# Patient Record
Sex: FEMALE | Race: BLACK OR AFRICAN AMERICAN
[De-identification: names, ages, dates, MRNs, and addresses within clinical notes are randomized per-mention and may not be internally consistent; named-entity substitution may affect disease eponyms.]

---

## 2017-06-12 ENCOUNTER — HOSPITAL ENCOUNTER (EMERGENCY)
Dept: HOSPITAL 17 - NEPE | Age: 42
Discharge: HOME | End: 2017-06-12
Payer: MEDICAID

## 2017-06-12 VITALS
HEART RATE: 99 BPM | TEMPERATURE: 98.4 F | OXYGEN SATURATION: 99 % | DIASTOLIC BLOOD PRESSURE: 79 MMHG | RESPIRATION RATE: 20 BRPM | SYSTOLIC BLOOD PRESSURE: 132 MMHG

## 2017-06-12 VITALS — BODY MASS INDEX: 46.09 KG/M2 | WEIGHT: 260.15 LBS | HEIGHT: 63 IN

## 2017-06-12 VITALS — OXYGEN SATURATION: 97 %

## 2017-06-12 DIAGNOSIS — R07.89: Primary | ICD-10-CM

## 2017-06-12 DIAGNOSIS — R00.0: ICD-10-CM

## 2017-06-12 LAB
ALP SERPL-CCNC: 54 U/L (ref 45–117)
ALT SERPL-CCNC: 46 U/L (ref 10–53)
ANION GAP SERPL CALC-SCNC: 9 MEQ/L (ref 5–15)
APTT BLD: 25.9 SEC (ref 24.3–30.1)
AST SERPL-CCNC: 35 U/L (ref 15–37)
BASOPHILS # BLD AUTO: 0.1 TH/MM3 (ref 0–0.2)
BASOPHILS NFR BLD: 0.6 % (ref 0–2)
BILIRUB SERPL-MCNC: 0.3 MG/DL (ref 0.2–1)
BUN SERPL-MCNC: 12 MG/DL (ref 7–18)
CHLORIDE SERPL-SCNC: 102 MEQ/L (ref 98–107)
CK MB SERPL-MCNC: 1.4 NG/ML (ref 0.5–3.6)
CK SERPL-CCNC: 254 U/L (ref 26–192)
EOSINOPHIL # BLD: 0.3 TH/MM3 (ref 0–0.4)
EOSINOPHIL NFR BLD: 3.1 % (ref 0–4)
ERYTHROCYTE [DISTWIDTH] IN BLOOD BY AUTOMATED COUNT: 13.6 % (ref 11.6–17.2)
GFR SERPLBLD BASED ON 1.73 SQ M-ARVRAT: 74 ML/MIN (ref 89–?)
HCO3 BLD-SCNC: 27.8 MEQ/L (ref 21–32)
HCT VFR BLD CALC: 40.4 % (ref 35–46)
HEMO FLAGS: (no result)
INR PPP: 0.9 RATIO
LYMPHOCYTES # BLD AUTO: 2.9 TH/MM3 (ref 1–4.8)
LYMPHOCYTES NFR BLD AUTO: 27.9 % (ref 9–44)
MCH RBC QN AUTO: 27.3 PG (ref 27–34)
MCHC RBC AUTO-ENTMCNC: 33.1 % (ref 32–36)
MCV RBC AUTO: 82.4 FL (ref 80–100)
MONOCYTES NFR BLD: 3.9 % (ref 0–8)
NEUTROPHILS # BLD AUTO: 6.7 TH/MM3 (ref 1.8–7.7)
NEUTROPHILS NFR BLD AUTO: 64.5 % (ref 16–70)
PLATELET # BLD: 204 TH/MM3 (ref 150–450)
POTASSIUM SERPL-SCNC: 4.1 MEQ/L (ref 3.5–5.1)
PROTHROMBIN TIME: 10.2 SEC (ref 9.8–11.6)
RBC # BLD AUTO: 4.91 MIL/MM3 (ref 4–5.3)
SODIUM SERPL-SCNC: 139 MEQ/L (ref 136–145)
WBC # BLD AUTO: 10.4 TH/MM3 (ref 4–11)

## 2017-06-12 PROCEDURE — 82550 ASSAY OF CK (CPK): CPT

## 2017-06-12 PROCEDURE — 85730 THROMBOPLASTIN TIME PARTIAL: CPT

## 2017-06-12 PROCEDURE — 99285 EMERGENCY DEPT VISIT HI MDM: CPT

## 2017-06-12 PROCEDURE — 84703 CHORIONIC GONADOTROPIN ASSAY: CPT

## 2017-06-12 PROCEDURE — 71010: CPT

## 2017-06-12 PROCEDURE — 85379 FIBRIN DEGRADATION QUANT: CPT

## 2017-06-12 PROCEDURE — 82552 ASSAY OF CPK IN BLOOD: CPT

## 2017-06-12 PROCEDURE — 85610 PROTHROMBIN TIME: CPT

## 2017-06-12 PROCEDURE — 93005 ELECTROCARDIOGRAM TRACING: CPT

## 2017-06-12 PROCEDURE — 85025 COMPLETE CBC W/AUTO DIFF WBC: CPT

## 2017-06-12 PROCEDURE — 80053 COMPREHEN METABOLIC PANEL: CPT

## 2017-06-12 PROCEDURE — 84484 ASSAY OF TROPONIN QUANT: CPT

## 2017-06-12 NOTE — PD
HPI


Chief Complaint:  Chest Pain


Time Seen by Provider:  12:36


Travel History


International Travel<30 days:  No


Contact w/Intl Traveler<30days:  No


Traveled to known affect area:  No





History of Present Illness


HPI


41-year-old female with a history of hypertension and asthma presents to the 

emergency department for evaluation of chest pain.  Patient states that she's 

had intermittent sharp chest pain underneath her left breast for the past 3 

days.  States that she's noticed the pain is present when she is sitting still 

and she has less pain when moving.  She states that this morning she is now 

having pain both under her left breast and her right breast.  States that the 

pain is associated with mild shortness of breath.  She states that this morning 

she also had some nasal congestion and postnasal drip.  Denies any fever, chills

, cough, swelling of the extremities, lightheadedness, dizziness, nausea, 

vomiting, abdominal pain.  Denies any history of MI or stents.  Denies any 

smoking history.  Last stress test was 2 years ago.  PCP Dr. Patrick.





[facility]


Past Medical History


Hx Anticoagulant Therapy:  No


Heart Rhythm Problems:  No


Cardiac Catheterization:  No


Cardiovascular Problems:  No


High Cholesterol:  Yes


Chemotherapy:  No


Congestive Heart Failure:  No


Cerebrovascular Accident:  No


Diabetes:  No


Diminished Hearing:  No


Hypertension:  Yes


Respiratory:  Yes (ASTHMA)


LMP:  17


Menopausal:  No


:  2


Para:  2


Tubal Ligation:  Yes





Past Surgical History


 Section:  Yes (X1)


Coronary Artery Bypass Graft:  No


Hysterectomy:  No





Social History


Alcohol Use:  Yes (occasional)


Tobacco Use:  No


Substance Use:  No





Allergies-Medications


(Allergen,Severity, Reaction):  


Coded Allergies:  


     No Known Allergies (Verified , 16)


Reported Meds & Prescriptions





Reported Meds & Active Scripts


Active


Meclizine Hcl (Meclizine HCl) 25 Mg Tab 25 Mg PO TID PRN


Lisinopril 20 mg (Lisinopril) 20 Mg Tab 1 Tab PO DAILY








Review of Systems


Except as stated in HPI:  all other systems reviewed are Neg





Physical Exam


Narrative


GENERAL: Well-nourished and well-developed pleasant female patient in no acute 

distress who is nontoxic appearing.


SKIN: Warm and dry.


HEAD: Normocephalic and atraumatic.  


EYES: No injection, drainage, or hyphema noted.  PERRLA.  EOMI.  


ENT: No nasal drainage noted.  Oropharynx is clear.


NECK: Supple and the trachea is midline. 


CARDIOVASCULAR: Regular rate and rhythm.


RESPIRATORY: Breath sounds are equal bilaterally with no accessory muscle use, 

wheezing, rhonchi, or crackles.


GASTROINTESTINAL: Abdomen is soft, non-tender, and nondistended.  


MUSCULOSKELETAL: No obvious deformities, swelling, cyanosis, or ecchymosis is 

present throughout the upper and lower extremities.  Patient has full range of 

motion without any signs of neurovascular compromise.  


NEUROLOGICAL: Awake, alert, and oriented. Normal speech and gait. Cranial 

nerves are grossly intact.





Data


Data


Last Documented VS





Vital Signs








  Date Time  Temp Pulse Resp B/P Pulse Ox O2 Delivery O2 Flow Rate FiO2


 


17 12:48     97 Room Air  


 


17 11:48 98.4 99 20 132/79    








Orders





 Electrocardiogram (17 )


Ckmb (Isoenzyme) Profile (17 12:34)


Complete Blood Count With Diff (17 12:34)


Comprehensive Metabolic Panel (17 12:34)


D-Dimer (17 12:34)


Prothrombin Time / Inr (Pt) (17 12:34)


Act Partial Throm Time (Ptt) (17 12:34)


Troponin I (17 12:34)


Chest, Single Ap (17 12:34)


Ecg Monitoring (17 12:34)


Bilateral Bp Monitoring (17 12:34)


Iv Access Insert/Monitor (17 12:34)


Oximetry (17 12:34)


Aspirin Chew (Aspirin Chew) (17 12:45)


Sodium Chloride 0.9% Flush (Ns Flush) (17 12:45)


Ed Urine Pregnancytest Poc (17 12:34)


CKMB (17 12:45)


CKMB% (17 12:45)





Labs





 Laboratory Tests








Test 17





 12:45


 


White Blood Count 10.4 TH/MM3


 


Red Blood Count 4.91 MIL/MM3


 


Hemoglobin 13.4 GM/DL


 


Hematocrit 40.4 %


 


Mean Corpuscular Volume 82.4 FL


 


Mean Corpuscular Hemoglobin 27.3 PG


 


Mean Corpuscular Hemoglobin 33.1 %





Concent 


 


Red Cell Distribution Width 13.6 %


 


Platelet Count 204 TH/MM3


 


Mean Platelet Volume 9.8 FL


 


Neutrophils (%) (Auto) 64.5 %


 


Lymphocytes (%) (Auto) 27.9 %


 


Monocytes (%) (Auto) 3.9 %


 


Eosinophils (%) (Auto) 3.1 %


 


Basophils (%) (Auto) 0.6 %


 


Neutrophils # (Auto) 6.7 TH/MM3


 


Lymphocytes # (Auto) 2.9 TH/MM3


 


Monocytes # (Auto) 0.4 TH/MM3


 


Eosinophils # (Auto) 0.3 TH/MM3


 


Basophils # (Auto) 0.1 TH/MM3


 


CBC Comment DIFF FINAL 


 


Differential Comment  


 


Prothrombin Time 10.2 SEC


 


Prothromb Time International 0.9 RATIO





Ratio 


 


Activated Partial 25.9 SEC





Thromboplast Time 


 


D-Dimer Quantitative (PE/DVT) 0.50 MG/L FEU


 


Sodium Level 139 MEQ/L


 


Potassium Level 4.1 MEQ/L


 


Chloride Level 102 MEQ/L


 


Carbon Dioxide Level 27.8 MEQ/L


 


Anion Gap 9 MEQ/L


 


Blood Urea Nitrogen 12 MG/DL


 


Creatinine 1.00 MG/DL


 


Estimat Glomerular Filtration 74 ML/MIN





Rate 


 


Random Glucose 124 MG/DL


 


Calcium Level 8.8 MG/DL


 


Total Bilirubin 0.3 MG/DL


 


Aspartate Amino Transf 35 U/L





(AST/SGOT) 


 


Alanine Aminotransferase 46 U/L





(ALT/SGPT) 


 


Alkaline Phosphatase 54 U/L


 


Total Creatine Kinase 254 U/L


 


Creatine Kinase MB 1.4 NG/ML


 


Creatine Kinase MB % 0.6 %


 


Troponin I 0.02 NG/ML


 


Total Protein 7.6 GM/DL


 


Albumin 3.5 GM/DL











[facility]


Medical Decision Making


Medical Screen Exam Complete:  Yes


Emergency Medical Condition:  Yes


Differential Diagnosis


Pleurisy versus ACS versus chest wall pain versus bronchitis versus PE


Narrative Course


41-year-old female presents to the emergency department for evaluation of chest 

pain intermittently for 3 days.  Patient is afebrile.  She is slightly 

tachycardic but otherwise vital signs are unremarkable.  EKG shows sinus 

tachycardia with a ventricular rate of 105 bpm, no acute ST elevations or 

depressions.  IV access is obtained, labs are been drawn and sent.  Patient is 

placed on cardiac telemetry and pulse oximetry monitoring.  She is administered 

aspirin 324 mg orally.





Chest x-ray is unremarkable.


CBC is unremarkable.


CMP is unremarkable.


CPK slightly elevated at 254.


Troponin is 0.02


Coags are unremarkable.


D-dimer is negative.





Patient has remained stable without complaint while here in the emergency 

department.  Patient states that she has developed more of a cough since she 

has been here.  I discussed with the patient and recommended admission to chest 

pain center for repeat cardiac enzymes, EKGs and possible stress testing.  

Patient however he elects to be discharged to home and follow-up as an 

outpatient with her PCP.  I discussed signs and symptoms of when to return to 

the emergency department.  Patient verbalizes understanding and agreement with 

treatment plan.





I discussed the case with my attending physician Dr. Payne who is aware of 

the patients history, physical examination findings, and treatment plan.





Diagnosis





 Primary Impression:  


 Atypical chest pain


Referrals:  


Primary Care Physician


Patient Instructions:  Chest Pain (ED), General Instructions, Pleurisy (ED)





***Additional Instructions:


We gave you the option of staying overnight in the chest pain center and you 

elected to be discharged to home.  If you develop any worsening of symptoms 

please return to the emergency department at any time.


Follow-up with your Primary Care Physician.


***Med/Other Pt SpecificInfo:  No Change to Meds


Disposition:  01 DISCHARGE HOME


Condition:  Stable








Smiley Hernández 2017 12:38

## 2017-06-12 NOTE — EKG
Date Performed: 06/12/2017       Time Performed: 12:06:29

 

PTAGE:      41 years

 

EKG:      SINUS TACHYCARDIA MINIMAL ST DEPRESSION ABNORMAL RHYTHM ECG COMPARED TO PRIOR ELECTROCARDIO
GRAM,  rate has increased 

 

 PREVIOUS TRACING            : 12/20/2015 10.50

 

DOCTOR:   Lupillo Lambert  Interpretating Date/Time  06/12/2017 15:12:18

## 2017-06-12 NOTE — RADRPT
EXAM DATE/TIME:  06/12/2017 12:39 

 

[facility] COMPARISON:     

CHEST SINGLE AP, December 20, 2015, 7:40.

 

                     

INDICATIONS :     

Chest pain for 2 or 3 days, pain under breasts, no shortness of breath at this time

                     

 

MEDICAL HISTORY :     

None.          

 

SURGICAL HISTORY :     

None.   

 

ENCOUNTER:     

Initial                                        

 

ACUITY:     

3 days      

 

PAIN SCORE:     

6/10

 

LOCATION:     

Bilateral chest 

 

FINDINGS:     

A single view of the chest demonstrates the lungs to be symmetrically aerated without evidence of mas
s, infiltrate or effusion.  The cardiomediastinal contours are unremarkable.  Osseous structures are 
intact.

 

CONCLUSION:     No acute disease.  

 

 

 

 Erlin Casillas MD FACR on June 12, 2017 at 13:05           

Board Certified Radiologist.

 This report was verified electronically.

## 2017-06-30 ENCOUNTER — HOSPITAL ENCOUNTER (EMERGENCY)
Dept: HOSPITAL 17 - NEPD | Age: 42
Discharge: HOME | End: 2017-06-30
Payer: MEDICAID

## 2017-06-30 VITALS — WEIGHT: 264.55 LBS | BODY MASS INDEX: 46.88 KG/M2 | HEIGHT: 63 IN

## 2017-06-30 VITALS
DIASTOLIC BLOOD PRESSURE: 54 MMHG | RESPIRATION RATE: 16 BRPM | HEART RATE: 93 BPM | TEMPERATURE: 98.6 F | SYSTOLIC BLOOD PRESSURE: 113 MMHG | OXYGEN SATURATION: 97 %

## 2017-06-30 DIAGNOSIS — J45.909: ICD-10-CM

## 2017-06-30 DIAGNOSIS — E78.00: ICD-10-CM

## 2017-06-30 DIAGNOSIS — I10: ICD-10-CM

## 2017-06-30 DIAGNOSIS — R07.89: ICD-10-CM

## 2017-06-30 DIAGNOSIS — E66.9: ICD-10-CM

## 2017-06-30 DIAGNOSIS — Z79.899: ICD-10-CM

## 2017-06-30 DIAGNOSIS — Z79.82: ICD-10-CM

## 2017-06-30 DIAGNOSIS — L03.115: Primary | ICD-10-CM

## 2017-06-30 LAB
ANION GAP SERPL CALC-SCNC: 7 MEQ/L (ref 5–15)
APTT BLD: 27.3 SEC (ref 24.3–30.1)
BASOPHILS # BLD AUTO: 0.1 TH/MM3 (ref 0–0.2)
BASOPHILS NFR BLD: 0.5 % (ref 0–2)
BUN SERPL-MCNC: 12 MG/DL (ref 7–18)
CHLORIDE SERPL-SCNC: 99 MEQ/L (ref 98–107)
EOSINOPHIL # BLD: 0.3 TH/MM3 (ref 0–0.4)
EOSINOPHIL NFR BLD: 2.3 % (ref 0–4)
ERYTHROCYTE [DISTWIDTH] IN BLOOD BY AUTOMATED COUNT: 13.9 % (ref 11.6–17.2)
GFR SERPLBLD BASED ON 1.73 SQ M-ARVRAT: 46 ML/MIN (ref 89–?)
HCO3 BLD-SCNC: 28.7 MEQ/L (ref 21–32)
HCT VFR BLD CALC: 42.7 % (ref 35–46)
HEMO FLAGS: (no result)
INR PPP: 1 RATIO
LYMPHOCYTES # BLD AUTO: 2.9 TH/MM3 (ref 1–4.8)
LYMPHOCYTES NFR BLD AUTO: 26 % (ref 9–44)
MCH RBC QN AUTO: 27.3 PG (ref 27–34)
MCHC RBC AUTO-ENTMCNC: 33.2 % (ref 32–36)
MCV RBC AUTO: 82.3 FL (ref 80–100)
MONOCYTES NFR BLD: 6.2 % (ref 0–8)
NEUTROPHILS # BLD AUTO: 7.2 TH/MM3 (ref 1.8–7.7)
NEUTROPHILS NFR BLD AUTO: 65 % (ref 16–70)
PLATELET # BLD: 176 TH/MM3 (ref 150–450)
POTASSIUM SERPL-SCNC: 4.5 MEQ/L (ref 3.5–5.1)
PROTHROMBIN TIME: 10.6 SEC (ref 9.8–11.6)
RBC # BLD AUTO: 5.19 MIL/MM3 (ref 4–5.3)
SODIUM SERPL-SCNC: 135 MEQ/L (ref 136–145)
WBC # BLD AUTO: 11.1 TH/MM3 (ref 4–11)

## 2017-06-30 PROCEDURE — 85025 COMPLETE CBC W/AUTO DIFF WBC: CPT

## 2017-06-30 PROCEDURE — 85730 THROMBOPLASTIN TIME PARTIAL: CPT

## 2017-06-30 PROCEDURE — 85610 PROTHROMBIN TIME: CPT

## 2017-06-30 PROCEDURE — 80048 BASIC METABOLIC PNL TOTAL CA: CPT

## 2017-06-30 PROCEDURE — 93971 EXTREMITY STUDY: CPT

## 2017-06-30 NOTE — PD
HPI


Chief Complaint:  Pain: Acute or Chronic


Time Seen by Provider:  10:34


Travel History


International Travel<30 days:  No


Contact w/Intl Traveler<30days:  No


Traveled to known affect area:  No





History of Present Illness


HPI


41-year-old female here with complaint of right leg pain and redness.  2 days 

ago in the right distal calf patient began to notice a slight amount of swelling

, redness and pain.  This has been present ever since and has not improved.  

She denies any open sores, lesions.  She has not had any fevers, chills.  No 

recent travel.  She denies any history of DVT, PE.  No associated shortness of 

breath but patient does state that she had some chest pain several weeks ago.  

She was seen here on  with a sharp inframammary chest pain that was worse 

with movement.  Laboratory workup including d-dimer at that time was negative.  

Patient states that the pain in the interim has resolved.





PF[facility]


Past Medical History


Hx Anticoagulant Therapy:  No


Heart Rhythm Problems:  No


Cardiac Catheterization:  No


Cardiovascular Problems:  Yes (HTN)


High Cholesterol:  Yes


Chemotherapy:  No


Congestive Heart Failure:  No


Cerebrovascular Accident:  No


Diabetes:  No


Diminished Hearing:  No


Hypertension:  Yes


Respiratory:  Yes (ASTHMA)


Pregnant?:  Not Pregnant


Menopausal:  No


:  2


Para:  2


Tubal Ligation:  Yes





Past Surgical History


 Section:  Yes (X1)


Coronary Artery Bypass Graft:  No


Hysterectomy:  No





Social History


Alcohol Use:  No


Tobacco Use:  No


Substance Use:  No





Allergies-Medications


(Allergen,Severity, Reaction):  


Coded Allergies:  


     No Known Allergies (Verified , 16)


Reported Meds & Prescriptions





Reported Meds & Active Scripts


Active


Reported


Excedrin Extra Strength (Aspirin-Acetaminophen-Caffeine) 1 Tab Tab   


Tylenol Extra Strength (Acetaminophen) 500 Mg Tablet   


Ventolin Hfa 18 GM Inh (Albuterol Sulfate) 90 Mcg/Act Aer 1 Puff INH Q4H PRN


Lisinopril 20 Mg Tab 20 Mg PO DAILY








Review of Systems


Except as stated in HPI:  all other systems reviewed are Neg





Physical Exam


Narrative


GENERAL: Obese  female in no acute distress


SKIN: Focused skin assessment warm/dry.


HEAD:  Normocephalic. 


EYES: No scleral icterus. No injection or drainage. 


ENT:   Mucous membranes pink and moist.


NECK: Supple


CARDIOVASCULAR: Regular rate and rhythm.  No murmur appreciated.


RESPIRATORY: No accessory muscle use. Clear to auscultation. Breath sounds 

equal bilaterally. 


GASTROINTESTINAL: Obese


MUSCULOSKELETAL: Right distal calf with erythema, warmth and minimal edema.  

Full pain-free range of motion.  There is no evidence of open lesions, palpable 

cords or fullness in the popliteal fossa.


NEUROLOGICAL: Awake and alert.  Normal speech.


PSYCHIATRIC: Appropriate mood and affect; insight and judgment normal.





Data


Data


Last Documented VS





Vital Signs








  Date Time  Temp Pulse Resp B/P Pulse Ox O2 Delivery O2 Flow Rate FiO2


 


17 10:21 98.6 93 16 113/54 97   








Orders





 Basic Metabolic Panel (Bmp) (17 10:38)


Complete Blood Count With Diff (17 10:38)


Prothrombin Time / Inr (Pt) (17 10:38)


Act Partial Throm Time (Ptt) (17 10:38)


Iv Access Insert/Monitor (17 10:38)


Sodium Chloride 0.9% Flush (Ns Flush) (17 10:45)


Us Leg Venous Doppler (17 )


Acetaminophen (Tylenol) (17 11:15)





Labs








 Laboratory Tests








Test 17





 10:55


 


White Blood Count 11.1 TH/MM3


 


Red Blood Count 5.19 MIL/MM3


 


Hemoglobin 14.2 GM/DL


 


Hematocrit 42.7 %


 


Mean Corpuscular Volume 82.3 FL


 


Mean Corpuscular Hemoglobin 27.3 PG


 


Mean Corpuscular Hemoglobin 33.2 %





Concent 


 


Red Cell Distribution Width 13.9 %


 


Platelet Count 176 TH/MM3


 


Mean Platelet Volume 9.8 FL


 


Neutrophils (%) (Auto) 65.0 %


 


Lymphocytes (%) (Auto) 26.0 %


 


Monocytes (%) (Auto) 6.2 %


 


Eosinophils (%) (Auto) 2.3 %


 


Basophils (%) (Auto) 0.5 %


 


Neutrophils # (Auto) 7.2 TH/MM3


 


Lymphocytes # (Auto) 2.9 TH/MM3


 


Monocytes # (Auto) 0.7 TH/MM3


 


Eosinophils # (Auto) 0.3 TH/MM3


 


Basophils # (Auto) 0.1 TH/MM3


 


CBC Comment DIFF FINAL 


 


Differential Comment  


 


Prothrombin Time 10.6 SEC


 


Prothromb Time International 1.0 RATIO





Ratio 


 


Activated Partial 27.3 SEC





Thromboplast Time 


 


Sodium Level 135 MEQ/L


 


Potassium Level 4.5 MEQ/L


 


Chloride Level 99 MEQ/L


 


Carbon Dioxide Level 28.7 MEQ/L


 


Anion Gap 7 MEQ/L


 


Blood Urea Nitrogen 12 MG/DL


 


Creatinine 1.51 MG/DL


 


Estimat Glomerular Filtration 46 ML/MIN





Rate 


 


Random Glucose 111 MG/DL


 


Calcium Level 9.9 MG/DL














[facility]


Medical Decision Making


Medical Screen Exam Complete:  Yes


Emergency Medical Condition:  Yes


Medical Record Reviewed:  Yes


Differential Diagnosis


41-year-old obese female here with 2 days of right leg pain and swelling.  Exam 

is consistent with cellulitis, differential includes DVT, Baker cyst.  Several 

weeks ago she did have some pleuritic chest pain but that has resolved in the 

interim and she is asymptomatic without chest pain or shortness of breath today.


Narrative Course


Patient placed on monitor, IV established and blood obtained.  CBC, BMP, coags 

unremarkable.  Duplex ultrasound of right lower extremity showed no evidence of 

DVT.  We'll discharge to home with antibiotics for cellulitis





Diagnosis





 Primary Impression:  


 Cellulitis, leg


 Qualified Code:  L03.115 - Cellulitis of right lower extremity


Referrals:  


Primary Care Physician


call for appointment





***Additional Instructions:


Finish antibiotics as prescribed.  Follow-up with primary care provider as 

needed.


***Med/Other Pt SpecificInfo:  Prescription(s) given


Scripts


Cephalexin (Keflex)500 Mg [id number] Mg PO QID  7 Days  Ref 0


   Prov:Sandy Keller MD         17


Disposition:  01 DISCHARGE HOME


Condition:  Stable








Sandy Keller MD 2017 10:43

## 2017-06-30 NOTE — RADRPT
EXAM DATE/TIME:  06/30/2017 10:55 

 

[facility] COMPARISON:     

No previous studies available for comparison.

        

 

 

INDICATIONS :                

Right leg pain and swelling.

            

 

MEDICAL HISTORY :     

Hypercholesterolemia.  Hypertension.    

 

SURGICAL HISTORY :     

Tubal ligation.    

 

ENCOUNTER:     

Initial

 

ACUITY:     

2 day

 

PAIN SCORE:      

10/10

 

LOCATION:      

Right  leg.

            

                      

 

TECHNIQUE:     

Venous ultrasound of the leg was performed from the inguinal ligament to the proximal calf.  Real-day
e, color Doppler and spectral tracing, compression and augmentation techniques were used.  

 

FINDINGS:     

There is normal compressibility of the deep venous system from the inguinal region to the proximal ca
lf.  No echogenic clot is seen in the lumen of the common femoral, femoral, popliteal, and posterior 
tibial veins.  There is a normal response of the venous system to proximal and distal augmentation an
d respiration.  

 

CONCLUSION:     No evidence of deep venous thrombosis within the right lower extremity. 

 

 

 Wilfredo Valdes MD on June 30, 2017 at 11:20           

Board Certified Radiologist.

 This report was verified electronically.

## 2018-03-02 ENCOUNTER — HOSPITAL ENCOUNTER (EMERGENCY)
Dept: HOSPITAL 17 - NEPE | Age: 43
Discharge: LEFT BEFORE DISCHARGE | End: 2018-03-02
Payer: MEDICAID

## 2018-03-02 VITALS — HEIGHT: 63 IN | BODY MASS INDEX: 47.85 KG/M2 | WEIGHT: 270.07 LBS

## 2018-03-02 VITALS
DIASTOLIC BLOOD PRESSURE: 89 MMHG | TEMPERATURE: 98 F | OXYGEN SATURATION: 98 % | HEART RATE: 88 BPM | RESPIRATION RATE: 20 BRPM | SYSTOLIC BLOOD PRESSURE: 165 MMHG

## 2018-03-02 DIAGNOSIS — R06.09: ICD-10-CM

## 2018-03-02 DIAGNOSIS — J45.909: ICD-10-CM

## 2018-03-02 DIAGNOSIS — I10: ICD-10-CM

## 2018-03-02 DIAGNOSIS — R07.9: Primary | ICD-10-CM

## 2018-03-02 DIAGNOSIS — E78.00: ICD-10-CM

## 2018-03-02 LAB
BASOPHILS # BLD AUTO: 0 TH/MM3 (ref 0–0.2)
BASOPHILS NFR BLD: 0.1 % (ref 0–2)
BUN SERPL-MCNC: 11 MG/DL (ref 7–18)
CALCIUM SERPL-MCNC: 9.8 MG/DL (ref 8.5–10.1)
CHLORIDE SERPL-SCNC: 104 MEQ/L (ref 98–107)
CREAT SERPL-MCNC: 0.86 MG/DL (ref 0.5–1)
EOSINOPHIL # BLD: 0 TH/MM3 (ref 0–0.4)
EOSINOPHIL NFR BLD: 0 % (ref 0–4)
ERYTHROCYTE [DISTWIDTH] IN BLOOD BY AUTOMATED COUNT: 13.9 % (ref 11.6–17.2)
GFR SERPLBLD BASED ON 1.73 SQ M-ARVRAT: 88 ML/MIN (ref 89–?)
GLUCOSE SERPL-MCNC: 121 MG/DL (ref 74–106)
HCO3 BLD-SCNC: 25.7 MEQ/L (ref 21–32)
HCT VFR BLD CALC: 40.8 % (ref 35–46)
HGB BLD-MCNC: 13.6 GM/DL (ref 11.6–15.3)
LYMPHOCYTES # BLD AUTO: 1.9 TH/MM3 (ref 1–4.8)
LYMPHOCYTES NFR BLD AUTO: 11.1 % (ref 9–44)
MCH RBC QN AUTO: 27.9 PG (ref 27–34)
MCHC RBC AUTO-ENTMCNC: 33.3 % (ref 32–36)
MCV RBC AUTO: 83.7 FL (ref 80–100)
MONOCYTE #: 0.2 TH/MM3 (ref 0–0.9)
MONOCYTES NFR BLD: 1.4 % (ref 0–8)
NEUTROPHILS # BLD AUTO: 15.1 TH/MM3 (ref 1.8–7.7)
NEUTROPHILS NFR BLD AUTO: 87.4 % (ref 16–70)
PLATELET # BLD: 212 TH/MM3 (ref 150–450)
PMV BLD AUTO: 9.6 FL (ref 7–11)
RBC # BLD AUTO: 4.87 MIL/MM3 (ref 4–5.3)
SODIUM SERPL-SCNC: 140 MEQ/L (ref 136–145)
TROPONIN I SERPL-MCNC: (no result) NG/ML (ref 0.02–0.05)
WBC # BLD AUTO: 17.3 TH/MM3 (ref 4–11)

## 2018-03-02 PROCEDURE — 84484 ASSAY OF TROPONIN QUANT: CPT

## 2018-03-02 PROCEDURE — 99284 EMERGENCY DEPT VISIT MOD MDM: CPT

## 2018-03-02 PROCEDURE — 82550 ASSAY OF CK (CPK): CPT

## 2018-03-02 PROCEDURE — 85025 COMPLETE CBC W/AUTO DIFF WBC: CPT

## 2018-03-02 PROCEDURE — 80048 BASIC METABOLIC PNL TOTAL CA: CPT

## 2018-03-02 PROCEDURE — 82552 ASSAY OF CPK IN BLOOD: CPT

## 2018-03-02 PROCEDURE — 93005 ELECTROCARDIOGRAM TRACING: CPT

## 2018-03-02 NOTE — PD
HPI


.


Shortness of breath and "my heart hurts"


Chief Complaint:  Respiratory Symptoms


Time Seen by Provider:  14:22


Travel History


International Travel<30 days:  No


Contact w/Intl Traveler<30days:  No


Traveled to known affect area:  No





History of Present Illness


HPI


This patient presents with a chief complaint of dyspnea on exertion and chest 

pain which started yesterday.  The chest pain is rated 5/10 with no modifying 

factors.  It has been present since yesterday.  Patient has admittedly been 

seen at an outside facility for this.  We are attempting to obtain those 

records.





PFSH


Past Medical History


Hx Anticoagulant Therapy:  No


Asthma:  Yes


Heart Rhythm Problems:  No


Cardiac Catheterization:  No


Cardiovascular Problems:  Yes (HTN)


High Cholesterol:  Yes


Chemotherapy:  No


Congestive Heart Failure:  No


Cerebrovascular Accident:  No


Diabetes:  No


Diminished Hearing:  No


Hypertension:  Yes


Respiratory:  Yes (ASTHMA)


Pregnant?:  Not Pregnant


LMP:  3/2/18


Menopausal:  No


:  2


Para:  2


Tubal Ligation:  Yes





Past Surgical History


 Section:  Yes (X1)


Coronary Artery Bypass Graft:  No


Hysterectomy:  No





Social History


Alcohol Use:  No


Tobacco Use:  No


Substance Use:  No





Allergies-Medications


(Allergen,Severity, Reaction):  


Coded Allergies:  


     No Known Allergies (Verified , 16)


Reported Meds & Prescriptions





Reported Meds & Active Scripts


Active


Reported


Excedrin Extra Strength (Aspirin-Acetaminophen-Caffeine) 1 Tab Tab   


Tylenol Extra Strength (Acetaminophen) 500 Mg Tablet   


Ventolin Hfa 18 GM Inh (Albuterol Sulfate) 90 Mcg/Act Aer 1 Puff INH Q4H PRN


Lisinopril 20 Mg Tab 20 Mg PO DAILY








Review of Systems


Except as stated in HPI:  all other systems reviewed are Neg


Cardiovascular:  Positive: Chest Pain or Discomfort


Respiratory:  Positive: Shortness of Breath


Gastrointestinal:  No: Nausea, Vomiting, Diarrhea, Abdominal Pain, Loss of 

Appetite





Physical Exam


Narrative


GENERAL: Patient was actively eating a lunch of all and drinking a monster 

drink when I went in to see her.


SKIN:  warm/dry.  Normal color and turgor.


HEAD: Normocephalic.  Atraumatic.


EYES: Pupils equal and round. No scleral icterus. No injection or drainage. 


ENT: No nasal bleeding or discharge.  Mucous membranes pink and moist.


NECK: Trachea midline.  Full range of motion without pain.. 


CARDIOVASCULAR: Regular rate and rhythm.  Heart sounds are normal.


RESPIRATORY: No accessory muscle use. Clear to auscultation. Breath sounds 

equal bilaterally. 


GASTROINTESTINAL: Abdomen soft.  Nontender.  Bowel sounds present.  

Nondistended.


MUSCULOSKELETAL: No obvious deformities. 


NEUROLOGICAL: Awake and alert. No obvious cranial nerve deficits.  Motor 

grossly within normal limits. Normal speech.


PSYCHIATRIC: Appropriate mood and affect; insight and judgment normal.





Data


Data


Last Documented VS





Vital Signs








  Date Time  Temp Pulse Resp B/P (MAP) Pulse Ox O2 Delivery O2 Flow Rate FiO2


 


3/2/18 14:30  92 18  98 Room Air  


 


3/2/18 11:34 98.0   165/89 (114)    








Orders





 Orders


Electrocardiogram (3/2/18 11:37)


Complete Blood Count With Diff (3/2/18 11:37)


Basic Metabolic Panel (Bmp) (3/2/18 11:37)


Ckmb (Isoenzyme) Profile (3/2/18 11:37)


Troponin I (3/2/18 11:37)


CKMB (3/2/18 11:45)


CKMB% (3/2/18 11:45)





Labs





Laboratory Tests








Test


  3/2/18


11:45


 


White Blood Count 17.3 TH/MM3 


 


Red Blood Count 4.87 MIL/MM3 


 


Hemoglobin 13.6 GM/DL 


 


Hematocrit 40.8 % 


 


Mean Corpuscular Volume 83.7 FL 


 


Mean Corpuscular Hemoglobin 27.9 PG 


 


Mean Corpuscular Hemoglobin


Concent 33.3 % 


 


 


Red Cell Distribution Width 13.9 % 


 


Platelet Count 212 TH/MM3 


 


Mean Platelet Volume 9.6 FL 


 


Neutrophils (%) (Auto) 87.4 % 


 


Lymphocytes (%) (Auto) 11.1 % 


 


Monocytes (%) (Auto) 1.4 % 


 


Eosinophils (%) (Auto) 0.0 % 


 


Basophils (%) (Auto) 0.1 % 


 


Neutrophils # (Auto) 15.1 TH/MM3 


 


Lymphocytes # (Auto) 1.9 TH/MM3 


 


Monocytes # (Auto) 0.2 TH/MM3 


 


Eosinophils # (Auto) 0.0 TH/MM3 


 


Basophils # (Auto) 0.0 TH/MM3 


 


CBC Comment DIFF FINAL 


 


Differential Comment  


 


Blood Urea Nitrogen 11 MG/DL 


 


Creatinine 0.86 MG/DL 


 


Random Glucose 121 MG/DL 


 


Calcium Level 9.8 MG/DL 


 


Sodium Level 140 MEQ/L 


 


Potassium Level 4.3 MEQ/L 


 


Chloride Level 104 MEQ/L 


 


Carbon Dioxide Level 25.7 MEQ/L 


 


Anion Gap 10 MEQ/L 


 


Estimat Glomerular Filtration


Rate 88 ML/MIN 


 


 


Total Creatine Kinase 205 U/L 


 


Creatine Kinase MB 2.8 NG/ML 


 


Creatine Kinase MB % 1.4 % 


 


Troponin I


  LESS THAN 0.02


NG/ML











MDM


Medical Decision Making


Medical Screen Exam Complete:  Yes


Emergency Medical Condition:  Yes


Medical Record Reviewed:  Yes (patient has been seen here in the past with 

chest pain.  She also has a history of hypertension and asthma.)


Interpretation(s)


EKG shows a normal sinus rhythm with no acute ischemic changes


Differential Diagnosis


Differential diagnosis of chest pain includes but is not limited to 

musculoskeletal pain, pulmonary embolism, acute coronary syndrome, pneumonia, 

pleurisy


Narrative Course


Patient presents complaining with chest pain or shortness of breath.  Symptoms 

have been present for 24 hours.  Patient has been previously evaluated at an 

outside hospital within the last 24 hours for this.  We are attempting to 

obtain those records.





In the meantime, the patient does not appear to be in any distress at all.  She 

is actively eating and drinking.  As she has had symptoms for over 24 hours, if 

her lab work is normal, she will be stable for discharge to home as she will 

have "ruled out" for ACS.





The patient was here in  with similar symptoms.  She was offered an 

admission to the chest pain center but declined.





CBC & BMP Diagram


3/2/18 11:45








Calcium Level 9.8





Troponin < 0.02, CKMB 2.8





CXR  patient left before the chest x-ray could be done





Diagnosis





 Primary Impression:  


 Chest pain


 Qualified Codes:  R07.9 - Chest pain, unspecified


 Additional Impression:  


 Dyspnea


 Qualified Codes:  R06.09 - Other forms of dyspnea


Patient Instructions:  Chest Pain (DC), Dyspnea (DC), General Instructions


Disposition:  07 AGAINST MEDICAL ADVICE


Condition:  Stable











Mendy Carter MD Mar 2, 2018 14:35

## 2018-03-03 NOTE — EKG
Date Performed: 03/02/2018       Time Performed: 11:40:40

 

PTAGE:      42 years

 

EKG:      Sinus rhythm 

 

 NORMAL ECG

 

PREVIOUS TRACING       : 06/12/2017 12.06

 

DOCTOR:   Yared Gonzalez  Interpretating Date/Time  03/03/2018 10:09:00